# Patient Record
(demographics unavailable — no encounter records)

---

## 2025-03-05 NOTE — PHYSICAL EXAM
[Chaperone Present] : A chaperone was present in the examining room during all aspects of the physical examination [Appropriately responsive] : appropriately responsive [Alert] : alert [No Acute Distress] : no acute distress [No Lymphadenopathy] : no lymphadenopathy [Regular Rate Rhythm] : regular rate rhythm [No Murmurs] : no murmurs [Clear to Auscultation B/L] : clear to auscultation bilaterally [Soft] : soft [Non-tender] : non-tender [Non-distended] : non-distended [No HSM] : No HSM [No Lesions] : no lesions [No Mass] : no mass [Oriented x3] : oriented x3 [Examination Of The Breasts] : a normal appearance [No Masses] : no breast masses were palpable [Labia Majora] : normal [Labia Minora] : normal [No Bleeding] : There was no active vaginal bleeding [Normal] : normal [Anteversion] : anteverted [Uterine Adnexae] : normal [FreeTextEntry2] : Nikia Neal MA [Tenderness] : nontender [Enlarged ___ wks] : not enlarged [FreeTextEntry4] : Whitish vaginal discharge, culture was obtained

## 2025-03-05 NOTE — REVIEW OF SYSTEMS
[Patient Intake Form Reviewed] : Patient intake form was reviewed [Negative] : Heme/Lymph [FreeTextEntry8] : White vaginal discharge

## 2025-03-05 NOTE — HISTORY OF PRESENT ILLNESS
[N] : Patient reports normal menses [Y] : Positive pregnancy history [Menarche Age: ____] : age at menarche was [unfilled] [No] : Patient does not have concerns regarding sex [Currently Active] : currently active [PapSmeardate] : 2023 [TextBox_31] : normal per pt [LMPDate] : 02/22/2025 [PGxTotal] : 1 [PGHxAbortions] : 1 [FreeTextEntry1] : 02/22/2025

## 2025-03-05 NOTE — DISCUSSION/SUMMARY
[FreeTextEntry1] : Assessment is monilial vaginitis otherwise normal GYN exam.  Will treat with Terazol cream.  Patient will call if not better after treatment.  Medical assistant Nikia was present in the room during entire examination and discussion.